# Patient Record
Sex: FEMALE | Race: WHITE | Employment: UNEMPLOYED | URBAN - METROPOLITAN AREA
[De-identification: names, ages, dates, MRNs, and addresses within clinical notes are randomized per-mention and may not be internally consistent; named-entity substitution may affect disease eponyms.]

---

## 2024-11-21 ENCOUNTER — APPOINTMENT (OUTPATIENT)
Dept: GENERAL RADIOLOGY | Age: 64
End: 2024-11-21

## 2024-11-21 ENCOUNTER — APPOINTMENT (OUTPATIENT)
Dept: CT IMAGING | Age: 64
End: 2024-11-21

## 2024-11-21 ENCOUNTER — HOSPITAL ENCOUNTER (EMERGENCY)
Age: 64
Discharge: HOME | End: 2024-11-21
Attending: EMERGENCY MEDICINE

## 2024-11-21 VITALS
DIASTOLIC BLOOD PRESSURE: 68 MMHG | HEIGHT: 63 IN | OXYGEN SATURATION: 99 % | HEART RATE: 76 BPM | TEMPERATURE: 97.9 F | SYSTOLIC BLOOD PRESSURE: 152 MMHG | BODY MASS INDEX: 26.58 KG/M2 | RESPIRATION RATE: 16 BRPM | WEIGHT: 150 LBS

## 2024-11-21 DIAGNOSIS — S09.90XA INJURY OF HEAD, INITIAL ENCOUNTER: ICD-10-CM

## 2024-11-21 DIAGNOSIS — S00.83XA CONTUSION OF FACE, INITIAL ENCOUNTER: ICD-10-CM

## 2024-11-21 DIAGNOSIS — S52.614A CLOSED NONDISPLACED FRACTURE OF STYLOID PROCESS OF RIGHT ULNA, INITIAL ENCOUNTER: Primary | ICD-10-CM

## 2024-11-21 PROCEDURE — 73130 X-RAY EXAM OF HAND: CPT

## 2024-11-21 PROCEDURE — 99284 EMERGENCY DEPT VISIT MOD MDM: CPT | Performed by: EMERGENCY MEDICINE

## 2024-11-21 PROCEDURE — 29125 APPL SHORT ARM SPLINT STATIC: CPT | Performed by: EMERGENCY MEDICINE

## 2024-11-21 PROCEDURE — 70486 CT MAXILLOFACIAL W/O DYE: CPT

## 2024-11-21 PROCEDURE — 73110 X-RAY EXAM OF WRIST: CPT

## 2024-11-21 PROCEDURE — 70450 CT HEAD/BRAIN W/O DYE: CPT

## 2024-11-21 RX ORDER — HYDROCODONE BITARTRATE AND ACETAMINOPHEN 5; 325 MG/1; MG/1
1 TABLET ORAL EVERY 6 HOURS PRN
Qty: 12 TABLET | Refills: 0 | Status: SHIPPED | OUTPATIENT
Start: 2024-11-21 | End: 2024-11-24

## 2024-11-21 ASSESSMENT — PAIN SCALES - GENERAL: PAINLEVEL_OUTOF10: 7

## 2024-11-21 ASSESSMENT — LIFESTYLE VARIABLES
HOW OFTEN DO YOU HAVE A DRINK CONTAINING ALCOHOL: NEVER
HOW MANY STANDARD DRINKS CONTAINING ALCOHOL DO YOU HAVE ON A TYPICAL DAY: PATIENT DOES NOT DRINK

## 2024-11-21 ASSESSMENT — PAIN - FUNCTIONAL ASSESSMENT: PAIN_FUNCTIONAL_ASSESSMENT: 0-10

## 2024-11-21 ASSESSMENT — PAIN DESCRIPTION - LOCATION: LOCATION: FACE;ARM

## 2024-11-21 ASSESSMENT — PAIN DESCRIPTION - PAIN TYPE: TYPE: ACUTE PAIN

## 2024-11-21 ASSESSMENT — PAIN DESCRIPTION - ORIENTATION: ORIENTATION: RIGHT

## 2024-11-21 NOTE — ED PROVIDER NOTES
[facility] EMERGENCY DEPARTMENT  EMERGENCY DEPARTMENT ENCOUNTER      Pt Name: Josee Marin  MRN: [id number]  Birthdate 1960  Date of evaluation: 11/21/2024  Provider: FABIO Carrillo CNP  12:57 PM    CHIEF COMPLAINT       Chief Complaint   Patient presents with    Fall     Pt arrives with co fall on pavement while walking. Pt states she hit her right side of face and right hand/fingers/ arm          HISTORY OF PRESENT ILLNESS    Josee Marin is a 64 y.o. female who presents to the emergency department      This is a nontoxic-appearing 64-year-old female presenting with her family members, the patient is visiting from [place], was out for a walk, when she had a mechanical fall on uneven concrete falling onto the right side, she was able to get up and arrives via private auto, and is having pain to the right hand right wrist has facial contusion right side of face patient is not anticoagulated denies that she lost consciousness has no chest pain or shortness of breath no acute neck or back pain, they were concerned due to the injuries prompting them to bring the patient to the emergency department for evaluation.  No alleviating measures were attempted prior to arrival.    The history is provided by the patient and a relative.       Nursing Notes were reviewed.    REVIEW OF SYSTEMS       Review of Systems   Constitutional:  Negative for chills, fatigue and fever.        Positive fall from standing.   HENT:  Negative for congestion and sore throat.         Positive for facial contusion.   Respiratory:  Negative for cough and shortness of breath.    Cardiovascular:  Negative for chest pain and leg swelling.   Gastrointestinal:  Negative for abdominal pain, diarrhea, nausea and vomiting.   Genitourinary:  Negative for dysuria and hematuria.   Musculoskeletal:  Negative for back pain and neck pain.        Positive for right wrist right hand pain.   Skin:  Negative for rash and wound.   Neurological:

## 2024-11-22 ASSESSMENT — ENCOUNTER SYMPTOMS
VOMITING: 0
BACK PAIN: 0
ABDOMINAL PAIN: 0
NAUSEA: 0
SORE THROAT: 0
SHORTNESS OF BREATH: 0
COUGH: 0
DIARRHEA: 0

## 2024-11-22 NOTE — DISCHARGE INSTRUCTIONS
Tylenol or ibuprofen as directed over-the-counter for mild to moderate pain.    Norco pain medication for severe pain no drinking alcohol or driving while taking this medication it can be habit-forming and cause constipation if you become constipated please use over-the-counter stool softener with stimulant.     Return to the ER: Fevers, atypical headaches, weakness or confusion, vomiting, worsening pain numbness or pale color to the right hand fingers, or any other concerning symptoms.

## 2024-11-23 ENCOUNTER — OFFICE VISIT (OUTPATIENT)
Dept: ORTHOPEDIC SURGERY | Age: 64
End: 2024-11-23

## 2024-11-23 VITALS — HEIGHT: 63 IN | BODY MASS INDEX: 26.58 KG/M2 | WEIGHT: 150 LBS | RESPIRATION RATE: 14 BRPM

## 2024-11-23 DIAGNOSIS — S52.614A CLOSED NONDISPLACED FRACTURE OF STYLOID PROCESS OF RIGHT ULNA, INITIAL ENCOUNTER: Primary | ICD-10-CM

## 2024-11-23 PROCEDURE — 1123F ACP DISCUSS/DSCN MKR DOCD: CPT | Performed by: PHYSICIAN ASSISTANT

## 2024-11-23 PROCEDURE — 99204 OFFICE O/P NEW MOD 45 MIN: CPT | Performed by: PHYSICIAN ASSISTANT

## 2024-11-26 NOTE — ED PROVIDER NOTES
[facility] Emergency Department  [address]  [place] [address]  Phone: [phone]  Fax: [phone]      Attending Physician Attestation          CHIEF COMPLAINT       Chief Complaint   Patient presents with    Fall     Pt arrives with co fall on pavement while walking. Pt states she hit her right side of face and right hand/fingers/ arm        DIAGNOSTIC RESULTS     LABS:  Labs Reviewed - No data to display    All other labs were within normal range or not returned as of this dictation.    RADIOLOGY:  XR WRIST RIGHT (MIN 3 VIEWS)   Final Result   Equivocal, acute nondisplaced ulnar styloid fracture.         XR HAND RIGHT (MIN 3 VIEWS)   Final Result   Equivocal, acute nondisplaced ulnar styloid fracture.         CT FACIAL BONES WO CONTRAST   Final Result   No acute facial bone trauma.         CT HEAD WO CONTRAST   Final Result   No acute intracranial abnormality.               EMERGENCY DEPARTMENT COURSE:   Vitals:    Vitals:    11/21/24 1749 11/21/24 1751 11/21/24 1819   BP:  (!) 152/68    Pulse: 76     Resp: 16     Temp:   97.9 °F (36.6 °C)   SpO2: 99%     Weight: 68 kg (150 lb)     Height: 1.6 m (5' 3\")       -------------------------  BP: (!) 152/68, Temp: 97.9 °F (36.6 °C), Pulse: 76, Respirations: 16             PERTINENT ATTENDING PHYSICIAN COMMENTS:    I performed a history and physical examination of the patient and discussed management with the mid level provider. I reviewed the mid level provider's note and agree with the documented findings and plan of care. Any areas of disagreement are noted on the chart. I was personally present for the key portions of any procedures. I have documented in the chart those procedures where I was not present during the key portions. I have reviewed the emergency nurses triage note. I agree with the chief complaint, past medical history, past surgical history, allergies, medications, social and family history as documented